# Patient Record
Sex: FEMALE | URBAN - METROPOLITAN AREA
[De-identification: names, ages, dates, MRNs, and addresses within clinical notes are randomized per-mention and may not be internally consistent; named-entity substitution may affect disease eponyms.]

---

## 2024-08-12 ENCOUNTER — OFFICE VISIT (OUTPATIENT)
Dept: ORTHOPEDIC SURGERY | Age: 49
End: 2024-08-12
Payer: COMMERCIAL

## 2024-08-12 DIAGNOSIS — M20.5X2 ACQUIRED CLAW TOE OF LEFT FOOT: ICD-10-CM

## 2024-08-12 DIAGNOSIS — M79.675 PAIN IN LEFT TOE(S): Primary | ICD-10-CM

## 2024-08-12 PROCEDURE — 99203 OFFICE O/P NEW LOW 30 MIN: CPT | Performed by: ORTHOPAEDIC SURGERY

## 2024-08-12 PROCEDURE — M5005 MISC DUAL ACTION: HCPCS | Performed by: ORTHOPAEDIC SURGERY

## 2024-08-12 NOTE — PROGRESS NOTES
Patient was fitted and instructed on a Dual Action Toe Spacer for the left foot. Patient read and signed documenting they understand and agree to [facility]'s current DME return policy.   
the 2nd MTP joint and protect/stabilize/support the injured/weakened MTP ligaments from further damage/injury       We discussed care and protection  PT: Instructed in detail on HEP Achilles stretching and MTP stretching  Orthotic/prosthetic:TFMT pad  Shoes: Outside for walking shoes recommend OOFOS: May try new OOFOS walking/running shoes       Medication: OTC meds prn unless prohibited by other treating MD/DO/NP/PA:   Surgical discussion: Discussed potential: Left second claw toe resection: Akin bunionectomy:  Follow up: Surgical partner if needed  Work status: Regular     This note was created using Dragon voice recognition software which may result in errors of speech and spelling recognition and word/phrase syntax errors.

## 2024-09-20 ENCOUNTER — OFFICE VISIT (OUTPATIENT)
Dept: ORTHOPEDIC SURGERY | Age: 49
End: 2024-09-20

## 2024-09-20 DIAGNOSIS — M77.42 METATARSALGIA, LEFT FOOT: Primary | ICD-10-CM

## 2024-12-16 ENCOUNTER — PATIENT MESSAGE (OUTPATIENT)
Dept: ORTHOPEDIC SURGERY | Age: 49
End: 2024-12-16

## 2025-01-09 ENCOUNTER — OFFICE VISIT (OUTPATIENT)
Dept: ORTHOPEDIC SURGERY | Age: 50
End: 2025-01-09
Payer: COMMERCIAL

## 2025-01-09 DIAGNOSIS — M77.42 METATARSALGIA, LEFT FOOT: ICD-10-CM

## 2025-01-09 DIAGNOSIS — M20.5X2 ACQUIRED CLAW TOE OF LEFT FOOT: Primary | ICD-10-CM

## 2025-01-09 PROCEDURE — 99214 OFFICE O/P EST MOD 30 MIN: CPT | Performed by: ORTHOPAEDIC SURGERY

## 2025-01-09 NOTE — PROGRESS NOTES
supinated there is good ankle dorsiflexion with the knee flexed and extended.   Neutral hindfoot alignment.  No cavovarus nor planovalgus foot deformity  Mild hallux valgus noted  Anterior drawer exam w/ ankle plantarflexed at 20 deg: normal  Mild second hammertoe with rigid deformity at the PIP joint.  Mild hallux valgus noted.  Tender to palpation underneath the second and third metatarsal heads with callosities under significantly underneath the third.    Neuro:  normal SILT to s/s/sp/dp/t.  Reflexes normal: 1+ patella reflex bilaterally, 1+ achilles reflex bilaterally, negative babinski bilaterally. no signs of hyper reflexia or absent reflex    Vascular: Bilateral DP and PT: posterior tibial 4/4    Imaging:   Interpretation of imaging and   X-Ray LEFT Foot 3 vw (AP/Lateral/Oblique) for foot pain   Findings: No signs of fracture or dislocations.  The TMT joints are aligned, there are no signs of neoplastic disease, or chronic disease.  The midtarsal and subtalar joints appear normal.   Impression:  Normal foot   Signature: Jasvir Metzger MD        Differential Diagnosis:     Second MTP synovitis     Treatment Plan:   I had a thorough discussion with the patient regarding the Treatment Plan    4 This is a chronic illness/condition with exacerbation and progression  Treatment at this time:   I had a thoroughly informed the patient of the plan for treatment.    I discussed non operative treatment initially.  We discussed Toes: Silicone spacers, toe sleeves, wider shoes, activity modifications, topical NSAIDS, toe splints and even specific therapy for the toes.  At this point Non surgical treatment has not relieved the patient symptoms    We discussed multiple surgical options.  We discussed surgery to be minimally invasive Akin osteotomy to the great toe, minimally invasive second hammertoe correction with screw, and minimally invasive distal metaphyseal metatarsal osteotomies..   I discussed with this patient

## 2025-05-01 ENCOUNTER — HOSPITAL ENCOUNTER (OUTPATIENT)
Dept: PHYSICAL THERAPY | Age: 50
Setting detail: RECURRING SERIES
Discharge: HOME | End: 2025-05-04
Payer: COMMERCIAL

## 2025-05-01 DIAGNOSIS — R39.89 OTHER SYMPTOMS AND SIGNS INVOLVING THE GENITOURINARY SYSTEM: ICD-10-CM

## 2025-05-01 DIAGNOSIS — M62.81 MUSCLE WEAKNESS (GENERALIZED): ICD-10-CM

## 2025-05-01 DIAGNOSIS — R27.8 OTHER LACK OF COORDINATION: Primary | ICD-10-CM

## 2025-05-01 DIAGNOSIS — N39.3 STRESS INCONTINENCE: ICD-10-CM

## 2025-05-01 PROCEDURE — 97530 THERAPEUTIC ACTIVITIES: CPT

## 2025-05-01 PROCEDURE — 97161 PT EVAL LOW COMPLEX 20 MIN: CPT

## 2025-05-01 ASSESSMENT — PAIN SCALES - GENERAL: PAINLEVEL_OUTOF10: 0

## 2025-05-01 NOTE — THERAPY EVALUATION
Courtney Rdz  : 1975  Primary: [employer] [place] [employer] ([facility])  Secondary:  [facility]  [address]  [address]  [place] [address]  Phone: [phone]  Fax: [phone] Plan Frequency: 1xweek/ 6 weeks    Plan of Care/Certification Expiration Date: 25        Plan of Care/Certification Expiration Date:  Plan of Care/Certification Expiration Date: 25    Frequency/Duration: Plan Frequency: 1xweek/ 6 weeks      Time In/Out:   Time In: 1640  Time Out: 1740      PT Visit Info:    Plan Frequency: 1xweek/ 6 weeks      Visit Count:  1                OUTPATIENT PHYSICAL THERAPY:             Initial Assessment 2025               Episode (PFPT)         Treatment Diagnosis:     Other lack of coordination  Other symptoms and signs involving the genitourinary system  Muscle weakness (generalized)  Stress incontinence  Contributing Diagnosis:  Frequency of micturition (R35.0), Functional urinary incontinence (R39.81), Pelvic floor dysfunction in female (M62.98), and Urgency of urination (R39.15)  Medical/Referring Diagnosis:    Unspecified urinary incontinence [R32]  PFD (pelvic floor dysfunction) [M62.89]  Referring Physician:  Shima Robles APRN - NP MD Orders:  PT Eval and Treat   Return MD Appt:  unknown  Date of Onset:  Onset Date: 24     Allergies:  Patient has no known allergies.  Restrictions/Precautions:    None      Medications Last Reviewed:  2025     SUBJECTIVE   History of Injury/Illness (Reason for Referral):  Ms. Rdz is a 49 yo female referred to pelvic floor physical therapy (PFPT) by Shima Robles, APR*  Unspecified urinary incontinence [R32]  PFD (pelvic floor dysfunction) [M62.89].    Pt states that she has been dealing with stress urinary incontinence for decades now. 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 Pt states that she is dealing with some

## 2025-05-01 NOTE — PROGRESS NOTES
Courtney Rdz  : 1975  Primary: [employer] [place] [employer] ([employer]  Secondary:  [facility]  [address]  [address]  [place] [address]  Phone: [phone]  Fax: [phone] Plan Frequency: 1xweek/ 6 weeks  Plan of Care/Certification Expiration Date: 25        Plan of Care/Certification Expiration Date:  Plan of Care/Certification Expiration Date: 25    Frequency/Duration: Plan Frequency: 1xweek/ 6 weeks      Time In/Out:   Time In: 1640  Time Out: 1740      PT Visit Info:         Visit Count:  1    OUTPATIENT PHYSICAL THERAPY:   Treatment Note 2025       Episode  (PFPT)               Treatment Diagnosis:    Other lack of coordination  Other symptoms and signs involving the genitourinary system  Muscle weakness (generalized)  Stress incontinence  Medical/Referring Diagnosis:    Unspecified urinary incontinence [R32]  PFD (pelvic floor dysfunction) [M62.89]  Referring Physician:  Shima Robles APRN - NP MD Orders:  PT Eval and Treat   Return MD Appt:  unknown   Date of Onset:  Onset Date: 24     Allergies:   Patient has no known allergies.  Restrictions/Precautions:   None      Interventions Planned (Treatment may consist of any combination of the following):     See Assessment Note    Subjective Comments:   See Evaluation 25  Initial Pain Level::     0/10  Post Session Pain Level:       0/10  Medications Last Reviewed:  2025  Updated Objective Findings:  See Evaluation Note from today  Treatment   THERAPEUTIC EXERCISE: (0 minutes): Exercises per grid below to improve strength and coordination.    Date:  25 Date:   Date:     Activity/Exercise Parameters Parameters Parameters   HEP None this day                    THERAPEUTIC ACTIVITY: ( 25 minutes): Functional activity education regarding anatomy, pathology and role of pelvic floor muscle (PFM) function in relation to presenting symptoms and role of pelvic floor therapy in conservative treatment., Functional

## 2025-05-22 ENCOUNTER — HOSPITAL ENCOUNTER (OUTPATIENT)
Dept: PHYSICAL THERAPY | Age: 50
Setting detail: RECURRING SERIES
Discharge: HOME | End: 2025-05-25
Payer: COMMERCIAL

## 2025-05-22 PROCEDURE — 97110 THERAPEUTIC EXERCISES: CPT

## 2025-05-22 ASSESSMENT — PAIN SCALES - GENERAL: PAINLEVEL_OUTOF10: 0

## 2025-05-22 NOTE — PROGRESS NOTES
Courtney Rdz  : 1975  Primary: [employer] [place] [employer] ([employer]  Secondary:  [place] [address]  [address]  [facility] [address]  Phone: [phone]  Fax: [phone] Plan Frequency: 1xweek/ 6 weeks  Plan of Care/Certification Expiration Date: 25        Plan of Care/Certification Expiration Date:  Plan of Care/Certification Expiration Date: 25    Frequency/Duration: Plan Frequency: 1xweek/ 6 weeks      Time In/Out:   Time In:   Time Out: 1754      PT Visit Info:         Visit Count:  2    OUTPATIENT PHYSICAL THERAPY:   Treatment Note 2025       Episode  (PFPT)               Treatment Diagnosis:    Other lack of coordination  Other symptoms and signs involving the genitourinary system  Muscle weakness (generalized)  Stress incontinence  Medical/Referring Diagnosis:    Unspecified urinary incontinence [R32]  PFD (pelvic floor dysfunction) [M62.89]  Referring Physician:  Shima Robles APRN - NP MD Orders:  PT Eval and Treat   Return MD Appt:  unknown   Date of Onset:  Onset Date: 24     Allergies:   Patient has no known allergies.  Restrictions/Precautions:   None      Interventions Planned (Treatment may consist of any combination of the following):     See Assessment Note    Subjective Comments:   Doing well, no questions from IE. Has been able to hold bladder a little longer.   Initial Pain Level::     0/10  Post Session Pain Level:       0/10  Medications Last Reviewed:  2025  Updated Objective Findings:  None Today  Treatment   THERAPEUTIC EXERCISE: (57 minutes): Exercises per grid below to improve strength and coordination.    Date:  25 Date:  25 Date:     Activity/Exercise Parameters Parameters Parameters   HEP None this day      Supine PF + TA  30 rep    S/l PF + TA + ball press down   30 reps ea side     Supine ball squeeze  PF + TA coordinated with breath, 3x10     Ball squeeze  PF + TA coordinated with breath, 3 x10

## 2025-05-29 ENCOUNTER — APPOINTMENT (OUTPATIENT)
Dept: PHYSICAL THERAPY | Age: 50
End: 2025-05-29
Payer: COMMERCIAL

## 2025-06-05 ENCOUNTER — HOSPITAL ENCOUNTER (OUTPATIENT)
Dept: PHYSICAL THERAPY | Age: 50
Setting detail: RECURRING SERIES
Discharge: HOME | End: 2025-06-08
Payer: COMMERCIAL

## 2025-06-05 PROCEDURE — 97110 THERAPEUTIC EXERCISES: CPT

## 2025-06-05 ASSESSMENT — PAIN SCALES - GENERAL: PAINLEVEL_OUTOF10: 0

## 2025-06-05 NOTE — PROGRESS NOTES
Vulvovaginal health/vaginal irritants     Body mechanics     Posture/ergonomics     Diaphragmatic breathing     Resources and technology     Other patient education           Treatment/Session Summary:    Treatment Assessment:   Added functional exercises this day with good tolerance. Pt with good carry over and good breathing mechanics.   Communication/Consultation:  None today  Equipment provided today:  HEP  Recommendations/Intent for next treatment session: Next visit will focus on up training .    >Total Treatment Billable Duration:  57 TE   Time In: 1101  Time Out: 1158    PRIYANK HINOJOSA PT         Charge Capture  Events  [employer] Portal  Appt Desk  Attendance Report     Future Appointments   Date Time Provider Department Center   6/12/2025 11:00 AM Priyank Hinojosa, YANIRA FERGUSON [facility]   6/17/2025 11:00 AM Priyank Hinojosa PT [facility]   6/26/2025 11:00 AM Priyank Hinojosa, YANIRA FELDERO

## 2025-06-12 ENCOUNTER — HOSPITAL ENCOUNTER (OUTPATIENT)
Dept: PHYSICAL THERAPY | Age: 50
Setting detail: RECURRING SERIES
Discharge: HOME | End: 2025-06-15
Payer: COMMERCIAL

## 2025-06-12 PROCEDURE — 97110 THERAPEUTIC EXERCISES: CPT

## 2025-06-12 ASSESSMENT — PAIN SCALES - GENERAL: PAINLEVEL_OUTOF10: 0

## 2025-06-12 NOTE — PROGRESS NOTES
Courtney Rdz  : 1975  Primary: [employer] [place] [employer] [place]  Secondary:  [place] [address]  [address]  [place] [address]  Phone: [phone]  Fax: [phone] Plan Frequency: 1xweek/ 6 weeks  Plan of Care/Certification Expiration Date: 25        Plan of Care/Certification Expiration Date:  Plan of Care/Certification Expiration Date: 25    Frequency/Duration: Plan Frequency: 1xweek/ 6 weeks      Time In/Out:   Time In: 1100  Time Out: 1155      PT Visit Info:         Visit Count:  4    OUTPATIENT PHYSICAL THERAPY:   Treatment Note 2025       Episode  (PFPT)               Treatment Diagnosis:    Other lack of coordination  Other symptoms and signs involving the genitourinary system  Muscle weakness (generalized)  Stress incontinence  Medical/Referring Diagnosis:    Unspecified urinary incontinence [R32]  PFD (pelvic floor dysfunction) [M62.89]  Referring Physician:  Shima Robles APRN - NP MD Orders:  PT Eval and Treat   Return MD Appt:  unknown   Date of Onset:  Onset Date: 24     Allergies:   Patient has no known allergies.  Restrictions/Precautions:   None      Interventions Planned (Treatment may consist of any combination of the following):     See Assessment Note    Subjective Comments:   Feels like she can go longer to hold bladder. States wasn't able to be as consistent with HEP this pas week as she'd like   Initial Pain Level::     0/10  Post Session Pain Level:       0/10  Medications Last Reviewed:  2025  Updated Objective Findings:  None Today  Treatment   THERAPEUTIC EXERCISE: (57 minutes): Exercises per grid below to improve strength and coordination.    Date:  25 Date:  25 Date:  25 Date:  25   Activity/Exercise Parameters Parameters Parameters Parameters    HEP None this day       Supine PF + TA  30 rep 30 reps     S/l PF + TA + ball press down   30 reps ea side      Supine ball squeeze  PF + TA

## 2025-06-17 ENCOUNTER — APPOINTMENT (OUTPATIENT)
Dept: PHYSICAL THERAPY | Age: 50
End: 2025-06-17
Payer: COMMERCIAL

## 2025-06-26 ENCOUNTER — HOSPITAL ENCOUNTER (OUTPATIENT)
Dept: PHYSICAL THERAPY | Age: 50
Setting detail: RECURRING SERIES
Discharge: HOME | End: 2025-06-29
Payer: COMMERCIAL

## 2025-06-26 PROCEDURE — 97110 THERAPEUTIC EXERCISES: CPT

## 2025-06-26 ASSESSMENT — PAIN SCALES - GENERAL: PAINLEVEL_OUTOF10: 0

## 2025-06-26 NOTE — PROGRESS NOTES
Courtney Rdz  : 1975  [place]: [employer] [place] [employer] [place] [employer]  Secondary:  [place] [address]  [address]  [facility] [address]  Phone: [phone]  Fax: [phone] Plan Frequency: 1xweek/ 6 weeks  Plan of Care/Certification Expiration Date: 25        Plan of Care/Certification Expiration Date:  Plan of Care/Certification Expiration Date: 25    Frequency/Duration: Plan Frequency: 1xweek/ 6 weeks      Time In/Out:   Time In: 1100  Time Out: 1157      PT Visit Info:         Visit Count:  5    OUTPATIENT PHYSICAL THERAPY:   Treatment Note 2025       Episode  (PFPT)               Treatment Diagnosis:    Other lack of coordination  Other symptoms and signs involving the genitourinary system  Muscle weakness (generalized)  Stress incontinence  Medical/Referring Diagnosis:    Unspecified urinary incontinence [R32]  PFD (pelvic floor dysfunction) [M62.89]  Referring Physician:  Shima Robles APRN - NP MD Orders:  PT Eval and Treat   Return MD Appt:  unknown   Date of Onset:  Onset Date: 24     Allergies:   Patient has no known allergies.  Restrictions/Precautions:   None      Interventions Planned (Treatment may consist of any combination of the following):     See Assessment Note    Subjective Comments:   Doing ok, does feel like symptoms are doing well.  Initial Pain Level::     0/10  Post Session Pain Level:       0/10  Medications Last Reviewed:  2025  Updated Objective Findings:  None Today  Treatment   THERAPEUTIC EXERCISE: (57 minutes): Exercises per grid below to improve strength and coordination.    Date:  25 Date:  25 Date:  25 Date:  25 Date:  25   Activity/Exercise Parameters Parameters Parameters Parameters  Parameters   HEP None this day        Supine PF + TA  30 rep 30 reps      S/l PF + TA + ball press down   30 reps ea side       Supine ball squeeze  PF + TA coordinated with breath, 3x10  PF + TA

## 2025-07-01 ENCOUNTER — HOSPITAL ENCOUNTER (OUTPATIENT)
Dept: PHYSICAL THERAPY | Age: 50
Setting detail: RECURRING SERIES
Discharge: HOME | End: 2025-07-04
Payer: COMMERCIAL

## 2025-07-01 PROCEDURE — 97110 THERAPEUTIC EXERCISES: CPT

## 2025-07-01 ASSESSMENT — PAIN SCALES - GENERAL: PAINLEVEL_OUTOF10: 0

## 2025-07-01 NOTE — PROGRESS NOTES
TA coordinated with breath, 3x10       Ball squeeze  PF + TA coordinated with breath, 3 x10  PF + TA coordinated with breath, 3 x10  PF + TA coordinated with breath, 3 x10  PF + TA coordinated with breath, 3 x10     Clams   PF + TA coordinated with breath 3x10        Bridges   PF + TA coordinated with breath, 3 x10 PF + TA coordinated with breath, 3 x10 PF + TA coordinated with breath, 3 x10 PF + TA coordinated with breath, 3 x10    Heel slides    PF + TA coordinated with breath 20 sets (R+L)  PF + TA coordinated with breath 20 sets (R+L)      Sit to stands   PF + TA coordinated with breath 3 x10      Hip hinge   PF + TA coordinated with breath 3 x10       squats    PF + TA coordinated with breath, 3x 10, 8 lbs  PF + TA coordinated with breath, 3x 10, 10 lbs  PF + TA coordinated with breath, 3x 10, 15 lbs    Deadlift    PF + TA coordinated with breath 3x10, 8 lbs   PF + TA coordinated with breath 3x10, 15 lbs    OHP    PF + TA coordinated with breath. 3 x10, 8 lbs      Standing QL     PF + TA coordinated with breath, 3x10, 8 lbs     SL RDL     PF + TA coordinated   wfth breath 3 x10, 10 lbs ea     OHP +  march      PF + TA coordinated with breath 3 x10 ea side, 8 lbs  PF + TA coordinated with breath 3 x10 ea side, 8 lbs    Side steps      PF + TA coordinated with breath 10 steps, 10 laps  PF + TA coordinated with breath 10 steps, 10 laps      THERAPEUTIC ACTIVITY: ( 0 minutes): Functional activity education regarding anatomy, pathology and role of pelvic floor muscle (PFM) function in relation to presenting symptoms and role of pelvic floor therapy in conservative treatment., Functional activity education on avoiding bladder irritants, substitutions for common irritants, recommended fluid intake (types and spacing), appropriate voiding frequency, weight management and overall contributing factors of bowel health on bladder health/function in order to improve independent self management. Patient was provided a list

## 2025-07-01 NOTE — DISCHARGE SUMMARY
Courtney Rdz  : 1975  Primary: [employer] [place] [employer] ([employer])  Secondary:  [place] [address]  [address]  [place] [address]  Phone: [phone]  Fax: [phone] Plan Frequency: 1xweek/ 6 weeks    Plan of Care/Certification Expiration Date: 25        Plan of Care/Certification Expiration Date:  Plan of Care/Certification Expiration Date: 25    Frequency/Duration: Plan Frequency: 1xweek/ 6 weeks      Time In/Out:   Time In: 1303  Time Out: 1350      PT Visit Info:    Plan Frequency: 1xweek/ 6 weeks      Visit Count:  6                OUTPATIENT PHYSICAL THERAPY:             Initial Assessment 2025               Episode (PFPT)         Treatment Diagnosis:     Other lack of coordination  Other symptoms and signs involving the genitourinary system  Muscle weakness (generalized)  Stress incontinence  Contributing Diagnosis:  Frequency of micturition (R35.0), Functional urinary incontinence (R39.81), Pelvic floor dysfunction in female (M62.98), and Urgency of urination (R39.15)  Medical/Referring Diagnosis:    Unspecified urinary incontinence [R32]  PFD (pelvic floor dysfunction) [M62.89]  Referring Physician:  Shima Robles APRN - NP  MD Orders:  PT Eval and Treat   Return MD Appt:  unknown  Date of Onset:  Onset Date: 24     Allergies:  Patient has no known allergies.  Restrictions/Precautions:    None      Medications Last Reviewed:  2025     SUBJECTIVE   History of Injury/Illness (Reason for Referral):  Ms. Rdz is a 49 yo female referred to pelvic floor physical therapy (PFPT) by Shima Robles, APR* / Unspecified urinary incontinence [R32]  PFD (pelvic floor dysfunction) [M62.89].    DS 25: Pt states that she can hold her bladder for longer periods of time. She states she is still getting some leakage with sneezing/coughing but thinks its is more influenced by the prolapse. She states that she does feel stronger. She